# Patient Record
Sex: FEMALE | Race: WHITE | NOT HISPANIC OR LATINO | URBAN - METROPOLITAN AREA
[De-identification: names, ages, dates, MRNs, and addresses within clinical notes are randomized per-mention and may not be internally consistent; named-entity substitution may affect disease eponyms.]

---

## 2020-12-14 ENCOUNTER — OFFICE VISIT (OUTPATIENT)
Dept: URBAN - METROPOLITAN AREA CLINIC 9 | Facility: CLINIC | Age: 25
End: 2020-12-14

## 2022-07-30 ENCOUNTER — TELEPHONE ENCOUNTER (OUTPATIENT)
Age: 27
End: 2022-07-30

## 2022-07-30 RX ORDER — MESALAMINE 4 G/60ML
50 ENEMA RECTAL
Qty: 0 | Refills: 2 | OUTPATIENT
Start: 2019-02-28 | End: 2019-03-06

## 2022-07-30 RX ORDER — ONDANSETRON HYDROCHLORIDE 4 MG/1
1 (ONE) TABLET, FILM COATED ORAL
Qty: 0 | Refills: 2 | OUTPATIENT
Start: 2019-03-14 | End: 2019-03-24

## 2022-07-30 RX ORDER — PREDNISONE 10 MG/1
1 (ONE) TABLET ORAL AS DIRECTED
Qty: 0 | Refills: 2 | OUTPATIENT
Start: 2019-05-06 | End: 2019-07-05

## 2022-07-30 RX ORDER — VEDOLIZUMAB 300 MG/5ML
300 INJECTION, POWDER, LYOPHILIZED, FOR SOLUTION INTRAVENOUS AS DIRECTED
Qty: 0 | Refills: 2 | OUTPATIENT
Start: 2020-03-30 | End: 2020-04-29

## 2022-07-30 RX ORDER — FAMOTIDINE 10 MG/1
2 (TWO) TABLET, FILM COATED ORAL
Qty: 0 | Refills: 16 | OUTPATIENT
Start: 2019-11-20 | End: 2019-12-24

## 2022-07-30 RX ORDER — MESALAMINE 375 MG/1
CAPSULE, EXTENDED RELEASE ORAL
Qty: 0 | Refills: 2 | OUTPATIENT
Start: 2019-12-24 | End: 2020-03-30

## 2022-07-30 RX ORDER — MESALAMINE 1.2 G/1
4 TABLET, DELAYED RELEASE ORAL DAILY
Qty: 0 | Refills: 2 | OUTPATIENT
Start: 2019-07-31 | End: 2019-08-19

## 2022-07-30 RX ORDER — MESALAMINE 1.2 G/1
1 (ONE) TABLET, DELAYED RELEASE ORAL
Qty: 0 | Refills: 2 | OUTPATIENT
Start: 2019-05-21 | End: 2019-07-20

## 2022-07-30 RX ORDER — HYOSCYAMINE SULFATE 0.12 MG/1
1 (ONE) TABLET, ORALLY DISINTEGRATING ORAL
Qty: 0 | Refills: 2 | OUTPATIENT
Start: 2019-05-06 | End: 2019-07-05

## 2022-07-30 RX ORDER — LACTOBACIL 2/BIFIDO 1/S.THERMO 450B CELL
1-2 PACKET (EA) ORAL
Qty: 0 | Refills: 2 | OUTPATIENT
Start: 2019-07-31 | End: 2019-08-30

## 2022-07-30 RX ORDER — HYDROCORTISONE 100 MG/60ML
1 (ONE) ENEMA RECTAL AT BEDTIME
Qty: 0 | Refills: 3 | OUTPATIENT
Start: 2019-05-21 | End: 2019-10-08

## 2022-07-30 RX ORDER — MESALAMINE 4 G/60ML
1 (ONE) KIT SUSPENSION RECTAL AT BEDTIME
Qty: 0 | Refills: 3 | OUTPATIENT
Start: 2019-03-01 | End: 2019-03-14

## 2022-07-30 RX ORDER — MESALAMINE 1.2 G/1
4 TABLET, DELAYED RELEASE ORAL DAILY
Qty: 0 | Refills: 5 | OUTPATIENT
Start: 2019-10-08 | End: 2019-10-08

## 2022-07-30 RX ORDER — DICYCLOMINE HYDROCHLORIDE 10 MG/1
1 (ONE) CAPSULE CAPSULE ORAL
Qty: 0 | Refills: 2 | OUTPATIENT
Start: 2019-02-19 | End: 2019-03-14

## 2022-07-30 RX ORDER — MESALAMINE 375 MG/1
4 CAPSULE, EXTENDED RELEASE ORAL DAILY
Qty: 0 | Refills: 16 | OUTPATIENT
Start: 2019-11-20 | End: 2019-11-20

## 2022-07-30 RX ORDER — BUDESONIDE 3 MG/1
1 (ONE) CAPSULE, COATED PELLETS ORAL
Qty: 0 | Refills: 3 | OUTPATIENT
Start: 2019-12-24 | End: 2020-03-30

## 2022-07-30 RX ORDER — VEDOLIZUMAB 300 MG/5ML
300 INJECTION, POWDER, LYOPHILIZED, FOR SOLUTION INTRAVENOUS AS DIRECTED
Qty: 0 | Refills: 2 | OUTPATIENT
Start: 2019-12-27 | End: 2020-01-26

## 2022-07-30 RX ORDER — MESALAMINE 1000 MG/1
1 (ONE) SUPPOSITORY RECTAL AT BEDTIME
Qty: 0 | Refills: 3 | OUTPATIENT
Start: 2019-10-08 | End: 2019-11-20

## 2022-07-31 ENCOUNTER — TELEPHONE ENCOUNTER (OUTPATIENT)
Age: 27
End: 2022-07-31

## 2022-07-31 RX ORDER — ONDANSETRON HYDROCHLORIDE 4 MG/1
1 (ONE) TABLET, FILM COATED ORAL
Qty: 0 | Refills: 2 | Status: ACTIVE | COMMUNITY
Start: 2019-03-14

## 2022-07-31 RX ORDER — VEDOLIZUMAB 300 MG/5ML
300 INJECTION, POWDER, LYOPHILIZED, FOR SOLUTION INTRAVENOUS AS DIRECTED
Qty: 0 | Refills: 2 | Status: ACTIVE | COMMUNITY
Start: 2019-12-27

## 2022-07-31 RX ORDER — MESALAMINE 375 MG/1
CAPSULE, EXTENDED RELEASE ORAL
Qty: 0 | Refills: 2 | Status: ACTIVE | COMMUNITY
Start: 2019-12-24

## 2022-07-31 RX ORDER — MESALAMINE 1000 MG/1
1 (ONE) SUPPOSITORY RECTAL AT BEDTIME
Qty: 0 | Refills: 3 | Status: ACTIVE | COMMUNITY
Start: 2019-07-31

## 2022-07-31 RX ORDER — HYDROCORTISONE 100 MG/60ML
1 (ONE) ENEMA RECTAL AT BEDTIME
Qty: 0 | Refills: 3 | Status: ACTIVE | COMMUNITY
Start: 2019-03-14

## 2022-07-31 RX ORDER — MESALAMINE 4 G/60ML
1 (ONE) SUSPENSION RECTAL AT BEDTIME
Qty: 0 | Refills: 3 | Status: ACTIVE | COMMUNITY
Start: 2019-03-01

## 2022-07-31 RX ORDER — MESALAMINE 1.2 G/1
1 (ONE) TABLET TABLET, DELAYED RELEASE ORAL
Qty: 0 | Refills: 2 | Status: ACTIVE | COMMUNITY
Start: 2019-03-14

## 2022-07-31 RX ORDER — FAMOTIDINE 10 MG/1
2 (TWO) TABLET, FILM COATED ORAL
Qty: 0 | Refills: 16 | Status: ACTIVE | COMMUNITY
Start: 2019-11-20

## 2022-07-31 RX ORDER — MESALAMINE 1.2 G/1
1 (ONE) TABLET, DELAYED RELEASE ORAL
Qty: 0 | Refills: 2 | Status: ACTIVE | COMMUNITY
Start: 2019-05-06

## 2022-07-31 RX ORDER — HYOSCYAMINE SULFATE 0.12 MG/1
1 (ONE) TABLET, ORALLY DISINTEGRATING ORAL
Qty: 0 | Refills: 2 | Status: ACTIVE | COMMUNITY
Start: 2019-05-06

## 2022-07-31 RX ORDER — HYDROCORTISONE 100 MG/60ML
1 (ONE) ENEMA RECTAL AT BEDTIME
Qty: 0 | Refills: 3 | Status: ACTIVE | COMMUNITY
Start: 2019-05-06

## 2022-07-31 RX ORDER — DICYCLOMINE HYDROCHLORIDE 20 MG/2ML
10 INJECTION, SOLUTION INTRAMUSCULAR
Qty: 0 | Refills: 2 | Status: ACTIVE | COMMUNITY
Start: 2019-02-19

## 2022-07-31 RX ORDER — MESALAMINE 1.2 G/1
4 TABLET, DELAYED RELEASE ORAL DAILY
Qty: 0 | Refills: 5 | Status: ACTIVE | COMMUNITY
Start: 2019-08-21

## 2022-07-31 RX ORDER — HYDROCORTISONE 100 MG/60ML
1 (ONE) ENEMA RECTAL AT BEDTIME
Qty: 0 | Refills: 3 | Status: ACTIVE | COMMUNITY
Start: 2019-05-21

## 2022-07-31 RX ORDER — HYOSCYAMINE SULFATE 0.12 MG/1
1 (ONE) TABLET, ORALLY DISINTEGRATING ORAL
Qty: 0 | Refills: 2 | Status: ACTIVE | COMMUNITY
Start: 2019-03-14

## 2022-07-31 RX ORDER — LACTOBACIL 2/BIFIDO 1/S.THERMO 450B CELL
1-2 PACKET (EA) ORAL
Qty: 0 | Refills: 2 | Status: ACTIVE | COMMUNITY
Start: 2019-07-31

## 2022-07-31 RX ORDER — MESALAMINE 1000 MG/1
1 (ONE) SUPPOSITORY RECTAL AT BEDTIME
Qty: 0 | Refills: 3 | Status: ACTIVE | COMMUNITY
Start: 2019-10-08

## 2022-07-31 RX ORDER — MESALAMINE 4 G/60ML
50 ENEMA RECTAL
Qty: 0 | Refills: 2 | Status: ACTIVE | COMMUNITY
Start: 2019-02-28

## 2022-07-31 RX ORDER — MESALAMINE 1.2 G/1
4 TABLET TABLET, DELAYED RELEASE ORAL DAILY
Qty: 0 | Refills: 5 | Status: ACTIVE | COMMUNITY
Start: 2019-09-30

## 2022-07-31 RX ORDER — VEDOLIZUMAB 300 MG/5ML
300 INJECTION, POWDER, LYOPHILIZED, FOR SOLUTION INTRAVENOUS AS DIRECTED
Qty: 0 | Refills: 2 | Status: ACTIVE | COMMUNITY
Start: 2020-03-30

## 2022-07-31 RX ORDER — MESALAMINE 375 MG/1
4 CAPSULE, EXTENDED RELEASE ORAL DAILY
Qty: 0 | Refills: 16 | Status: ACTIVE | COMMUNITY
Start: 2019-10-08

## 2022-07-31 RX ORDER — MESALAMINE 1.2 G/1
4 TABLET, DELAYED RELEASE ORAL DAILY
Qty: 0 | Refills: 2 | Status: ACTIVE | COMMUNITY
Start: 2019-08-16

## 2022-07-31 RX ORDER — DICYCLOMINE HYDROCHLORIDE 10 MG/1
1 (ONE) CAPSULE ORAL
Qty: 0 | Refills: 2 | Status: ACTIVE | COMMUNITY
Start: 2019-02-19

## 2022-07-31 RX ORDER — MESALAMINE 1.2 G/1
1 (ONE) TABLET, DELAYED RELEASE ORAL
Qty: 0 | Refills: 2 | Status: ACTIVE | COMMUNITY
Start: 2019-05-21

## 2022-07-31 RX ORDER — PREDNISONE 10 MG/1
1 (ONE) TABLET ORAL AS DIRECTED
Qty: 0 | Refills: 2 | Status: ACTIVE | COMMUNITY
Start: 2019-05-06

## 2022-07-31 RX ORDER — VEDOLIZUMAB 300 MG/5ML
300 INJECTION, POWDER, LYOPHILIZED, FOR SOLUTION INTRAVENOUS AS DIRECTED
Qty: 0 | Refills: 2 | Status: ACTIVE | COMMUNITY
Start: 2019-12-24

## 2022-07-31 RX ORDER — MESALAMINE 1.2 G/1
4 TABLET, DELAYED RELEASE ORAL DAILY
Qty: 0 | Refills: 2 | Status: ACTIVE | COMMUNITY
Start: 2019-07-31

## 2022-07-31 RX ORDER — BUDESONIDE 3 MG/1
1 (ONE) CAPSULE, COATED PELLETS ORAL
Qty: 0 | Refills: 3 | Status: ACTIVE | COMMUNITY
Start: 2019-11-20

## 2022-07-31 RX ORDER — BUDESONIDE 3 MG/1
1 (ONE) CAPSULE, COATED PELLETS ORAL
Qty: 0 | Refills: 3 | Status: ACTIVE | COMMUNITY
Start: 2019-12-24

## 2022-07-31 RX ORDER — MESALAMINE 4 G/60ML
1 (ONE) SUSPENSION RECTAL AT BEDTIME
Qty: 0 | Refills: 3 | Status: ACTIVE | COMMUNITY
Start: 2019-02-28

## 2022-11-22 ENCOUNTER — TELEPHONE ENCOUNTER (OUTPATIENT)
Dept: URBAN - METROPOLITAN AREA CLINIC 9 | Facility: CLINIC | Age: 27
End: 2022-11-22

## 2022-11-29 ENCOUNTER — TELEPHONE ENCOUNTER (OUTPATIENT)
Dept: URBAN - METROPOLITAN AREA CLINIC 7 | Facility: CLINIC | Age: 27
End: 2022-11-29

## 2022-12-21 ENCOUNTER — OFFICE VISIT (OUTPATIENT)
Dept: URBAN - METROPOLITAN AREA CLINIC 9 | Facility: CLINIC | Age: 27
End: 2022-12-21
Payer: COMMERCIAL

## 2022-12-21 ENCOUNTER — WEB ENCOUNTER (OUTPATIENT)
Dept: URBAN - METROPOLITAN AREA CLINIC 9 | Facility: CLINIC | Age: 27
End: 2022-12-21

## 2022-12-21 VITALS
DIASTOLIC BLOOD PRESSURE: 80 MMHG | WEIGHT: 272 LBS | HEIGHT: 63 IN | BODY MASS INDEX: 48.2 KG/M2 | SYSTOLIC BLOOD PRESSURE: 138 MMHG

## 2022-12-21 DIAGNOSIS — K51.911 ULCERATIVE COLITIS WITH RECTAL BLEEDING: ICD-10-CM

## 2022-12-21 DIAGNOSIS — K21.9 GASTRO-ESOPHAGEAL REFLUX DISEASE WITHOUT ESOPHAGITIS: ICD-10-CM

## 2022-12-21 PROCEDURE — 99214 OFFICE O/P EST MOD 30 MIN: CPT | Performed by: INTERNAL MEDICINE

## 2022-12-21 RX ORDER — BUDESONIDE 3 MG/1
1 (ONE) CAPSULE, COATED PELLETS ORAL
Qty: 0 | Refills: 3 | Status: DISCONTINUED | COMMUNITY
Start: 2019-12-24

## 2022-12-21 RX ORDER — MESALAMINE 1000 MG/1
1 (ONE) SUPPOSITORY RECTAL AT BEDTIME
Qty: 0 | Refills: 3 | Status: DISCONTINUED | COMMUNITY
Start: 2019-10-08

## 2022-12-21 RX ORDER — PREDNISONE 10 MG/1
1 (ONE) TABLET ORAL AS DIRECTED
Qty: 0 | Refills: 2 | Status: DISCONTINUED | COMMUNITY
Start: 2019-05-06

## 2022-12-21 RX ORDER — MESALAMINE 4 G/60ML
1 (ONE) SUSPENSION RECTAL AT BEDTIME
Qty: 0 | Refills: 3 | Status: DISCONTINUED | COMMUNITY
Start: 2019-02-28

## 2022-12-21 RX ORDER — LACTOBACIL 2/BIFIDO 1/S.THERMO 450B CELL
1-2 PACKET (EA) ORAL
Qty: 0 | Refills: 2 | Status: DISCONTINUED | COMMUNITY
Start: 2019-07-31

## 2022-12-21 RX ORDER — MESALAMINE 4 G/60ML
50 ENEMA RECTAL
Qty: 0 | Refills: 2 | Status: DISCONTINUED | COMMUNITY
Start: 2019-02-28

## 2022-12-21 RX ORDER — MESALAMINE 1.2 G/1
4 TABLET, DELAYED RELEASE ORAL DAILY
Qty: 0 | Refills: 2 | Status: DISCONTINUED | COMMUNITY
Start: 2019-07-31

## 2022-12-21 RX ORDER — HYOSCYAMINE SULFATE 0.12 MG/1
1 (ONE) TABLET, ORALLY DISINTEGRATING ORAL
Qty: 0 | Refills: 2 | Status: DISCONTINUED | COMMUNITY
Start: 2019-03-14

## 2022-12-21 RX ORDER — DICYCLOMINE HYDROCHLORIDE 20 MG/2ML
10 INJECTION, SOLUTION INTRAMUSCULAR
Qty: 0 | Refills: 2 | Status: DISCONTINUED | COMMUNITY
Start: 2019-02-19

## 2022-12-21 RX ORDER — VEDOLIZUMAB 300 MG/5ML
300 INJECTION, POWDER, LYOPHILIZED, FOR SOLUTION INTRAVENOUS AS DIRECTED
Qty: 0 | Refills: 2 | Status: ACTIVE | COMMUNITY
Start: 2019-12-24

## 2022-12-21 RX ORDER — DICYCLOMINE HYDROCHLORIDE 10 MG/1
1 (ONE) CAPSULE ORAL
Qty: 0 | Refills: 2 | Status: DISCONTINUED | COMMUNITY
Start: 2019-02-19

## 2022-12-21 RX ORDER — HYDROCORTISONE 100 MG/60ML
1 (ONE) ENEMA RECTAL AT BEDTIME
Qty: 0 | Refills: 3 | Status: DISCONTINUED | COMMUNITY
Start: 2019-05-06

## 2022-12-21 RX ORDER — MESALAMINE 375 MG/1
CAPSULE, EXTENDED RELEASE ORAL
Qty: 0 | Refills: 2 | Status: DISCONTINUED | COMMUNITY
Start: 2019-12-24

## 2022-12-21 RX ORDER — HYDROXYZINE HYDROCHLORIDE 25 MG/1
AS DIRECTED TABLET, FILM COATED ORAL TID
Status: ACTIVE | COMMUNITY

## 2022-12-21 RX ORDER — FAMOTIDINE 10 MG/1
2 (TWO) TABLET, FILM COATED ORAL
Qty: 0 | Refills: 16 | Status: DISCONTINUED | COMMUNITY
Start: 2019-11-20

## 2022-12-21 RX ORDER — VEDOLIZUMAB 300 MG/5ML
300 INJECTION, POWDER, LYOPHILIZED, FOR SOLUTION INTRAVENOUS AS DIRECTED
OUTPATIENT
Start: 2019-12-24

## 2022-12-21 RX ORDER — ONDANSETRON HYDROCHLORIDE 4 MG/1
1 (ONE) TABLET, FILM COATED ORAL
Qty: 0 | Refills: 2 | Status: DISCONTINUED | COMMUNITY
Start: 2019-03-14

## 2022-12-21 RX ORDER — MESALAMINE 1.2 G/1
4 TABLET, DELAYED RELEASE ORAL DAILY
Qty: 0 | Refills: 2 | Status: DISCONTINUED | COMMUNITY
Start: 2019-08-16

## 2022-12-21 NOTE — HPI-TODAY'S VISIT:
Pt with distal UC here for follow up. Dx in 2019 . Pt has ongoing UC symptoms . Colonoscopy 2/2019 with ulcerative proctitis, more proximal biopsied negative 2019 EGD with negative HP and duodenal biopsies.  Failed: Lialda 4 tabs daily Failed apriso Failed canasa Failed hydrocortisone Failed steroids Diet changes Plan based diet Turmeric VSL#3 . Pt here today to re-0establish after nearliy 3 years. She has continued entyvio but has not seen a GI. I advised she needs close f/u and cannot have lapses. I advised we cont entyvio but check the levels, TB, HIV, hepatitis B and fecal calpro and colon to re-assess disease activity. She is agreeable.  .

## 2022-12-30 ENCOUNTER — CLAIMS CREATED FROM THE CLAIM WINDOW (OUTPATIENT)
Dept: URBAN - METROPOLITAN AREA CLINIC 4 | Facility: CLINIC | Age: 27
End: 2022-12-30
Payer: COMMERCIAL

## 2022-12-30 ENCOUNTER — CLAIMS CREATED FROM THE CLAIM WINDOW (OUTPATIENT)
Dept: URBAN - METROPOLITAN AREA SURGERY CENTER 9 | Facility: SURGERY CENTER | Age: 27
End: 2022-12-30
Payer: COMMERCIAL

## 2022-12-30 DIAGNOSIS — K63.89 BACTERIAL OVERGROWTH SYNDROME: ICD-10-CM

## 2022-12-30 DIAGNOSIS — K64.0 BLEEDING GRADE I HEMORRHOIDS: ICD-10-CM

## 2022-12-30 DIAGNOSIS — K63.89 OTHER SPECIFIED DISEASES OF INTESTINE: ICD-10-CM

## 2022-12-30 DIAGNOSIS — Z87.19 ESOPHAGEAL FOOD BOLUS: ICD-10-CM

## 2022-12-30 PROCEDURE — 45380 COLONOSCOPY AND BIOPSY: CPT | Performed by: INTERNAL MEDICINE

## 2022-12-30 PROCEDURE — 88305 TISSUE EXAM BY PATHOLOGIST: CPT | Performed by: PATHOLOGY

## 2022-12-30 RX ORDER — HYDROXYZINE HYDROCHLORIDE 25 MG/1
AS DIRECTED TABLET, FILM COATED ORAL TID
Status: ACTIVE | COMMUNITY

## 2022-12-30 RX ORDER — VEDOLIZUMAB 300 MG/5ML
300 INJECTION, POWDER, LYOPHILIZED, FOR SOLUTION INTRAVENOUS AS DIRECTED
Status: ACTIVE | COMMUNITY
Start: 2019-12-24

## 2023-01-30 LAB — CALPROTECTIN, FECAL: 59

## 2023-02-03 ENCOUNTER — OFFICE VISIT (OUTPATIENT)
Dept: URBAN - METROPOLITAN AREA CLINIC 9 | Facility: CLINIC | Age: 28
End: 2023-02-03
Payer: COMMERCIAL

## 2023-02-03 VITALS
BODY MASS INDEX: 47.84 KG/M2 | SYSTOLIC BLOOD PRESSURE: 118 MMHG | HEIGHT: 63 IN | WEIGHT: 270 LBS | DIASTOLIC BLOOD PRESSURE: 78 MMHG

## 2023-02-03 DIAGNOSIS — K51.911 ULCERATIVE COLITIS WITH RECTAL BLEEDING: ICD-10-CM

## 2023-02-03 DIAGNOSIS — K21.9 GASTRO-ESOPHAGEAL REFLUX DISEASE WITHOUT ESOPHAGITIS: ICD-10-CM

## 2023-02-03 LAB
HEPATITIS B CORE AB TOTAL: (no result)
HEPATITIS B SURFACE AB IMMUNITY, QN: >1000
HEPATITIS B SURFACE ANTIGEN: (no result)
HIV AG/AB, 4TH GEN: (no result)
MITOGEN-NIL: 9.84
QUANTIFERON NIL VALUE: 0.01
QUANTIFERON TB1 AG VALUE: 0.01
QUANTIFERON TB2 AG VALUE: 0.01
QUANTIFERON-TB GOLD PLUS: NEGATIVE
VEDOLIZUMAB AB, S: <9.8
VEDOLIZUMAB QN, S: 8.8
VEMAB INTERPRETATION: (no result)

## 2023-02-03 PROCEDURE — 99214 OFFICE O/P EST MOD 30 MIN: CPT | Performed by: INTERNAL MEDICINE

## 2023-02-03 RX ORDER — HYDROXYZINE HYDROCHLORIDE 25 MG/1
AS DIRECTED TABLET, FILM COATED ORAL TID
Status: ACTIVE | COMMUNITY

## 2023-02-03 RX ORDER — VEDOLIZUMAB 300 MG/5ML
300 INJECTION, POWDER, LYOPHILIZED, FOR SOLUTION INTRAVENOUS AS DIRECTED
OUTPATIENT
Start: 2019-12-24

## 2023-02-03 RX ORDER — CHOLECALCIFEROL (VITAMIN D3) 50 MCG
1 TABLET TABLET ORAL ONCE A DAY
Status: ACTIVE | COMMUNITY

## 2023-02-03 RX ORDER — MULTIVITAMIN WITH IRON
AS DIRECTED TABLET ORAL
Status: ACTIVE | COMMUNITY

## 2023-02-03 RX ORDER — LACTOBACIL 2/BIFIDO 1/S.THERMO 900B CELL
2 CAPSULES PACKET (EA) ORAL DAILY
Qty: 60 | Refills: 6 | OUTPATIENT
Start: 2023-02-03 | End: 2023-09-01

## 2023-02-03 RX ORDER — DICYCLOMINE HYDROCHLORIDE 10 MG/1
1 CAPSULE CAPSULE ORAL
Qty: 60 | Refills: 3 | OUTPATIENT

## 2023-02-03 RX ORDER — VEDOLIZUMAB 300 MG/5ML
300 INJECTION, POWDER, LYOPHILIZED, FOR SOLUTION INTRAVENOUS AS DIRECTED
Status: ACTIVE | COMMUNITY
Start: 2019-12-24

## 2023-02-03 NOTE — HPI-TODAY'S VISIT:
Pt with distal UC here for follow up. Dx in 2019 . Pt has ongoing UC symptoms . Colonoscopy 2/2019 with ulcerative proctitis, more proximal biopsied negative 2019 EGD with negative HP and duodenal biopsies. 2023 Colon with bx neg for active colitis . Failed: Lialda 4 tabs daily Failed apriso Failed canasa Failed hydrocortisone Failed steroids Diet changes Plan based diet Turmeric VSL#3 . 2022 FEcal calpro normal HIV/Hep B/TB neg Entyvio level pending . I reviewed her stool testes, her hepatitis labs and TB test . Pt here for f/u, overall is doing well but some IBS symptoms. We are awaiting her entyvio level but otherwise her colon and calpro would suggest remission. RTC 6 months and hold on any changes provided entyvio is stable.  .  .

## 2023-02-21 ENCOUNTER — TELEPHONE ENCOUNTER (OUTPATIENT)
Dept: URBAN - METROPOLITAN AREA CLINIC 7 | Facility: CLINIC | Age: 28
End: 2023-02-21

## 2023-08-04 ENCOUNTER — OFFICE VISIT (OUTPATIENT)
Dept: URBAN - METROPOLITAN AREA CLINIC 9 | Facility: CLINIC | Age: 28
End: 2023-08-04

## 2023-09-07 ENCOUNTER — WEB ENCOUNTER (OUTPATIENT)
Dept: URBAN - METROPOLITAN AREA CLINIC 9 | Facility: CLINIC | Age: 28
End: 2023-09-07

## 2023-09-07 ENCOUNTER — OFFICE VISIT (OUTPATIENT)
Dept: URBAN - METROPOLITAN AREA CLINIC 9 | Facility: CLINIC | Age: 28
End: 2023-09-07
Payer: COMMERCIAL

## 2023-09-07 VITALS
WEIGHT: 262 LBS | SYSTOLIC BLOOD PRESSURE: 132 MMHG | DIASTOLIC BLOOD PRESSURE: 72 MMHG | BODY MASS INDEX: 46.42 KG/M2 | HEIGHT: 63 IN

## 2023-09-07 DIAGNOSIS — K51.911 ULCERATIVE COLITIS WITH RECTAL BLEEDING: ICD-10-CM

## 2023-09-07 PROCEDURE — 99213 OFFICE O/P EST LOW 20 MIN: CPT | Performed by: INTERNAL MEDICINE

## 2023-09-07 RX ORDER — VEDOLIZUMAB 300 MG/5ML
300 INJECTION, POWDER, LYOPHILIZED, FOR SOLUTION INTRAVENOUS AS DIRECTED
OUTPATIENT
Start: 2019-12-24

## 2023-09-07 RX ORDER — CHOLECALCIFEROL (VITAMIN D3) 50 MCG
1 TABLET TABLET ORAL ONCE A DAY
Status: ACTIVE | COMMUNITY

## 2023-09-07 RX ORDER — DESVENLAFAXINE 50 MG/1
1 TABLET TABLET, EXTENDED RELEASE ORAL ONCE A DAY
Status: ACTIVE | COMMUNITY

## 2023-09-07 RX ORDER — DICYCLOMINE HYDROCHLORIDE 10 MG/1
1 CAPSULE CAPSULE ORAL
OUTPATIENT

## 2023-09-07 RX ORDER — MULTIVITAMIN WITH IRON
AS DIRECTED TABLET ORAL
Status: ACTIVE | COMMUNITY

## 2023-09-07 RX ORDER — VEDOLIZUMAB 300 MG/5ML
300 INJECTION, POWDER, LYOPHILIZED, FOR SOLUTION INTRAVENOUS AS DIRECTED
Status: ACTIVE | COMMUNITY
Start: 2019-12-24

## 2023-09-07 RX ORDER — HYDROXYZINE HYDROCHLORIDE 25 MG/1
AS DIRECTED TABLET, FILM COATED ORAL TID
Status: ACTIVE | COMMUNITY

## 2023-09-07 RX ORDER — DICYCLOMINE HYDROCHLORIDE 10 MG/1
1 CAPSULE CAPSULE ORAL
Qty: 60 | Refills: 3 | Status: ON HOLD | COMMUNITY

## 2023-09-07 NOTE — HPI-TODAY'S VISIT:
Pt with distal UC here for follow up. Dx in 2019 . Pt has ongoing UC symptoms . Colonoscopy 2/2019 with ulcerative proctitis, more proximal biopsied negative 2019 EGD with negative HP and duodenal biopsies. 2023 Colon with bx neg for active colitis . Failed: Lialda 4 tabs daily Failed apriso Failed canasa Failed hydrocortisone Failed steroids Diet changes Plan based diet Turmeric VSL#3 . 2022 FEcal calpro normal HIV/Hep B/TB neg Entyvio level 8 in early 2023, no ab . Today she is here for f/u. She has been off entyvio since 5/2023 due to insurance issues and we will restart her entyvio. She is agreeable. RTC 6 mo.  .

## 2023-09-08 ENCOUNTER — OFFICE VISIT (OUTPATIENT)
Dept: URBAN - METROPOLITAN AREA CLINIC 9 | Facility: CLINIC | Age: 28
End: 2023-09-08

## 2023-09-12 ENCOUNTER — TELEPHONE ENCOUNTER (OUTPATIENT)
Dept: URBAN - METROPOLITAN AREA CLINIC 7 | Facility: CLINIC | Age: 28
End: 2023-09-12

## 2023-09-12 RX ORDER — VEDOLIZUMAB 300 MG/5ML
300 INJECTION, POWDER, LYOPHILIZED, FOR SOLUTION INTRAVENOUS AS DIRECTED
Start: 2019-12-24

## 2023-09-22 ENCOUNTER — TELEPHONE ENCOUNTER (OUTPATIENT)
Dept: URBAN - METROPOLITAN AREA CLINIC 9 | Facility: CLINIC | Age: 28
End: 2023-09-22

## 2023-12-20 ENCOUNTER — TELEPHONE ENCOUNTER (OUTPATIENT)
Dept: URBAN - METROPOLITAN AREA CLINIC 9 | Facility: CLINIC | Age: 28
End: 2023-12-20

## 2023-12-20 RX ORDER — VEDOLIZUMAB 300 MG/5ML
300 INJECTION, POWDER, LYOPHILIZED, FOR SOLUTION INTRAVENOUS
Qty: 1 | Refills: 6
Start: 2019-12-24 | End: 2025-01-15

## 2024-03-07 ENCOUNTER — OV EP (OUTPATIENT)
Dept: URBAN - METROPOLITAN AREA CLINIC 9 | Facility: CLINIC | Age: 29
End: 2024-03-07

## 2024-05-02 ENCOUNTER — OFFICE VISIT (OUTPATIENT)
Dept: URBAN - METROPOLITAN AREA CLINIC 9 | Facility: CLINIC | Age: 29
End: 2024-05-02
Payer: COMMERCIAL

## 2024-05-02 ENCOUNTER — DASHBOARD ENCOUNTERS (OUTPATIENT)
Age: 29
End: 2024-05-02

## 2024-05-02 VITALS
SYSTOLIC BLOOD PRESSURE: 128 MMHG | BODY MASS INDEX: 46.42 KG/M2 | WEIGHT: 262 LBS | HEIGHT: 63 IN | DIASTOLIC BLOOD PRESSURE: 74 MMHG

## 2024-05-02 DIAGNOSIS — K21.9 GASTRO-ESOPHAGEAL REFLUX DISEASE WITHOUT ESOPHAGITIS: ICD-10-CM

## 2024-05-02 DIAGNOSIS — K51.911 ULCERATIVE COLITIS WITH RECTAL BLEEDING: ICD-10-CM

## 2024-05-02 PROCEDURE — 99214 OFFICE O/P EST MOD 30 MIN: CPT | Performed by: INTERNAL MEDICINE

## 2024-05-02 RX ORDER — DICYCLOMINE HYDROCHLORIDE 10 MG/1
1 CAPSULE CAPSULE ORAL
Status: ON HOLD | COMMUNITY

## 2024-05-02 RX ORDER — VEDOLIZUMAB 300 MG/5ML
300 INJECTION, POWDER, LYOPHILIZED, FOR SOLUTION INTRAVENOUS
Qty: 1 | Refills: 6 | Status: ACTIVE | COMMUNITY
Start: 2019-12-24 | End: 2025-01-15

## 2024-05-02 RX ORDER — MULTIVITAMIN WITH IRON
AS DIRECTED TABLET ORAL
Status: ON HOLD | COMMUNITY

## 2024-05-02 RX ORDER — CHOLECALCIFEROL (VITAMIN D3) 50 MCG
1 TABLET TABLET ORAL ONCE A DAY
Status: ON HOLD | COMMUNITY

## 2024-05-02 RX ORDER — DESVENLAFAXINE 50 MG/1
1 TABLET TABLET, EXTENDED RELEASE ORAL ONCE A DAY
Status: ON HOLD | COMMUNITY

## 2024-05-02 RX ORDER — VEDOLIZUMAB 300 MG/5ML
300 INJECTION, POWDER, LYOPHILIZED, FOR SOLUTION INTRAVENOUS
OUTPATIENT
Start: 2019-12-24

## 2024-05-02 RX ORDER — HYDROXYZINE HYDROCHLORIDE 25 MG/1
AS DIRECTED TABLET, FILM COATED ORAL TID
Status: ON HOLD | COMMUNITY

## 2024-05-17 ENCOUNTER — TELEPHONE ENCOUNTER (OUTPATIENT)
Dept: URBAN - METROPOLITAN AREA CLINIC 9 | Facility: CLINIC | Age: 29
End: 2024-05-17

## 2024-05-22 ENCOUNTER — TELEPHONE ENCOUNTER (OUTPATIENT)
Dept: URBAN - METROPOLITAN AREA CLINIC 9 | Facility: CLINIC | Age: 29
End: 2024-05-22

## 2024-06-07 ENCOUNTER — TELEPHONE ENCOUNTER (OUTPATIENT)
Dept: URBAN - METROPOLITAN AREA CLINIC 9 | Facility: CLINIC | Age: 29
End: 2024-06-07

## 2024-06-26 ENCOUNTER — TELEPHONE ENCOUNTER (OUTPATIENT)
Dept: URBAN - METROPOLITAN AREA CLINIC 9 | Facility: CLINIC | Age: 29
End: 2024-06-26

## 2024-07-05 ENCOUNTER — TELEPHONE ENCOUNTER (OUTPATIENT)
Dept: URBAN - METROPOLITAN AREA CLINIC 9 | Facility: CLINIC | Age: 29
End: 2024-07-05

## 2024-07-30 ENCOUNTER — TELEPHONE ENCOUNTER (OUTPATIENT)
Dept: URBAN - METROPOLITAN AREA CLINIC 9 | Facility: CLINIC | Age: 29
End: 2024-07-30

## 2024-10-02 ENCOUNTER — OFFICE VISIT (OUTPATIENT)
Dept: URBAN - METROPOLITAN AREA CLINIC 9 | Facility: CLINIC | Age: 29
End: 2024-10-02
Payer: COMMERCIAL

## 2024-10-02 VITALS
WEIGHT: 280 LBS | DIASTOLIC BLOOD PRESSURE: 76 MMHG | HEIGHT: 63 IN | SYSTOLIC BLOOD PRESSURE: 126 MMHG | BODY MASS INDEX: 49.61 KG/M2

## 2024-10-02 DIAGNOSIS — K51.911 ULCERATIVE COLITIS WITH RECTAL BLEEDING: ICD-10-CM

## 2024-10-02 PROCEDURE — 99213 OFFICE O/P EST LOW 20 MIN: CPT | Performed by: INTERNAL MEDICINE

## 2024-10-02 RX ORDER — CHOLECALCIFEROL (VITAMIN D3) 50 MCG
1 TABLET TABLET ORAL ONCE A DAY
Status: ON HOLD | COMMUNITY

## 2024-10-02 RX ORDER — HYDROXYZINE HYDROCHLORIDE 25 MG/1
AS DIRECTED TABLET, FILM COATED ORAL TID
Status: ON HOLD | COMMUNITY

## 2024-10-02 RX ORDER — VEDOLIZUMAB 300 MG/5ML
300 INJECTION, POWDER, LYOPHILIZED, FOR SOLUTION INTRAVENOUS
OUTPATIENT
Start: 2019-12-24

## 2024-10-02 RX ORDER — MULTIVITAMIN WITH IRON
AS DIRECTED TABLET ORAL
Status: ON HOLD | COMMUNITY

## 2024-10-02 RX ORDER — DICYCLOMINE HYDROCHLORIDE 10 MG/1
1 CAPSULE CAPSULE ORAL
Status: ON HOLD | COMMUNITY

## 2024-10-02 RX ORDER — VEDOLIZUMAB 300 MG/5ML
300 INJECTION, POWDER, LYOPHILIZED, FOR SOLUTION INTRAVENOUS
Status: ON HOLD | COMMUNITY
Start: 2019-12-24

## 2024-10-02 RX ORDER — DESVENLAFAXINE 50 MG/1
1 TABLET TABLET, EXTENDED RELEASE ORAL ONCE A DAY
Status: ON HOLD | COMMUNITY

## 2024-10-02 NOTE — PHYSICAL EXAM HENT:
Head, normocephalic, atraumatic, External nose normal appearance, Clear bilaterally, pupils equal, round and reactive to light.

## 2024-10-02 NOTE — HPI-TODAY'S VISIT:
Pt with distal UC here for follow up. Dx in 2019 . Colonoscopy 2/2019 with ulcerative proctitis, more proximal biopsied negative 2019 EGD with negative HP and duodenal biopsies. 2023 Colon with bx neg for active colitis . Failed: Lialda 4 tabs daily Failed apriso Failed canasa Failed hydrocortisone Failed steroids Diet changes Plan based diet Turmeric VSL#3 . 2022 Fecal calpro normal HIV/Hep B/TB neg . Entyvio level 8 in early 2023, no ab . At last visit she was doing well. We ordered HIV, hep B, TB but she didn't do. I did review CBC, BMP, lfts from 9/2024 was negative. We do not have a recent fecal calprotectin. I advised we check that. Her last colon was clear in 2022. She is doing well without bleeding. She may still be in remission and hasn't had entyvio for 5 months. We will check a calpro. If low hold on entyvio unless sx develop. If high then we need to restart entyvio and hopefully she will not reject.  . .   .

## 2024-10-18 LAB — HIV AG/AB, 4TH GEN: (no result)

## 2024-10-19 LAB
HEPATITIS B SURFACE AB IMMUNITY, QN: >1000
HEPATITIS B SURFACE ANTIGEN: (no result)
MITOGEN-NIL: 7.27
QUANTIFERON NIL VALUE: 0.03
QUANTIFERON TB1 AG VALUE: 0.02
QUANTIFERON TB2 AG VALUE: 0.01
QUANTIFERON-TB GOLD PLUS: NEGATIVE

## 2024-11-06 ENCOUNTER — TELEPHONE ENCOUNTER (OUTPATIENT)
Dept: URBAN - METROPOLITAN AREA CLINIC 7 | Facility: CLINIC | Age: 29
End: 2024-11-06

## 2025-03-31 ENCOUNTER — OFFICE VISIT (OUTPATIENT)
Dept: URBAN - METROPOLITAN AREA CLINIC 9 | Facility: CLINIC | Age: 30
End: 2025-03-31
Payer: COMMERCIAL

## 2025-03-31 DIAGNOSIS — K51.20 ULCERATIVE PROCTITIS WITHOUT COMPLICATION: ICD-10-CM

## 2025-03-31 PROBLEM — 10811000202109: Status: ACTIVE | Noted: 2025-03-31

## 2025-03-31 PROCEDURE — 99213 OFFICE O/P EST LOW 20 MIN: CPT | Performed by: PHYSICIAN ASSISTANT

## 2025-03-31 RX ORDER — DICYCLOMINE HYDROCHLORIDE 10 MG/1
1 CAPSULE CAPSULE ORAL
Status: DISCONTINUED | COMMUNITY

## 2025-03-31 RX ORDER — DESVENLAFAXINE 50 MG/1
1 TABLET TABLET, EXTENDED RELEASE ORAL ONCE A DAY
Status: DISCONTINUED | COMMUNITY

## 2025-03-31 RX ORDER — HYDROXYZINE HYDROCHLORIDE 25 MG/1
AS DIRECTED TABLET, FILM COATED ORAL TID
Status: ON HOLD | COMMUNITY

## 2025-03-31 RX ORDER — MULTIVITAMIN WITH IRON
AS DIRECTED TABLET ORAL
Status: ON HOLD | COMMUNITY

## 2025-03-31 RX ORDER — CHOLECALCIFEROL (VITAMIN D3) 50 MCG
1 TABLET TABLET ORAL ONCE A DAY
Status: ON HOLD | COMMUNITY

## 2025-03-31 NOTE — HPI-TODAY'S VISIT:
Pt with distal UC- dx in 2019.  Former patient of Dr. Oliva.  Failed: Lialda, apriso, canasa Failed hydrocortisone Diet changes Plan based diet Turmeric VSL#3 . ENDOs: Colonoscopy 2/2019 with ulcerative proctitis, more proximal biopsied negative 2019 EGD with negative HP and duodenal biopsies. 2023 Colon with bx neg for active colitis . Diagnostics: 2022 Fecal calpro normal Entyvio level 8 in early 2023, no ab 10/2024 vitamin B12-NL, vitamin D low 11/2024 fecal Shayan Pro normal, Hepatitis B surface showed immunity, QuantiFERON gold negative hep B surface antigen nonreactive . Interim visit 3/31/2025 30-year-old patient presents today in follow-up+ hx of distal ulcerative colitis dx 2019.  Has been in remission.  Off Entyvio for a year.  Had normal fecal Shayan 11/2024.  Last colonoscopy 2023 - neg for active colitis. No diarrhea, blood, or mucus except for some IBS-D like symptoms after a meal once in a while.   Will check a fecal calprotectin. Repeat colon 2026 /2027 for surveillance. Dx 2/2029.  RTC 6 months sooner if symptoms were to develop...

## 2025-04-29 ENCOUNTER — OFFICE VISIT (OUTPATIENT)
Dept: URBAN - METROPOLITAN AREA CLINIC 9 | Facility: CLINIC | Age: 30
End: 2025-04-29

## 2025-05-03 ENCOUNTER — LAB OUTSIDE AN ENCOUNTER (OUTPATIENT)
Dept: URBAN - METROPOLITAN AREA CLINIC 7 | Facility: CLINIC | Age: 30
End: 2025-05-03

## 2025-07-30 ENCOUNTER — OFFICE VISIT (OUTPATIENT)
Age: 30
End: 2025-07-30

## 2025-07-30 VITALS
RESPIRATION RATE: 18 BRPM | BODY MASS INDEX: 51.21 KG/M2 | HEART RATE: 94 BPM | TEMPERATURE: 98.4 F | DIASTOLIC BLOOD PRESSURE: 87 MMHG | HEIGHT: 63 IN | WEIGHT: 289 LBS | SYSTOLIC BLOOD PRESSURE: 120 MMHG | OXYGEN SATURATION: 96 %

## 2025-07-30 DIAGNOSIS — J01.90 ACUTE BACTERIAL SINUSITIS: Primary | ICD-10-CM

## 2025-07-30 DIAGNOSIS — B96.89 ACUTE BACTERIAL SINUSITIS: Primary | ICD-10-CM

## 2025-07-30 RX ORDER — AZITHROMYCIN 250 MG/1
TABLET, FILM COATED ORAL
Qty: 6 TABLET | Refills: 0 | Status: SHIPPED | OUTPATIENT
Start: 2025-07-30 | End: 2025-08-09

## 2025-07-30 RX ORDER — AZITHROMYCIN 250 MG/1
TABLET, FILM COATED ORAL
Qty: 6 TABLET | Refills: 0 | Status: SHIPPED | OUTPATIENT
Start: 2025-07-30 | End: 2025-07-30

## 2025-07-30 ASSESSMENT — ENCOUNTER SYMPTOMS
WHEEZING: 0
CHEST TIGHTNESS: 0
SINUS PAIN: 1
GASTROINTESTINAL NEGATIVE: 1
COUGH: 1
SINUS PRESSURE: 1
SORE THROAT: 1
SHORTNESS OF BREATH: 0

## 2025-07-30 NOTE — PROGRESS NOTES
SUBJECTIVE/OBJECTIVE:    Limitation to History: None    Outside Historian: None    External Records Reviewed: None    SUBJECTIVE/OBJECTIVE:  Leatha Romero is a 30 y.o. female presents with complaint of nasal congestion sore throat cough and new onset lost voice for over 1 week.  Patient states her symptoms are worsening.  She reports her mucus returning from whitish to now thick and green.  She reports having quite a bit of postnasal drip, and has lost her voice as a result.  She does have tenderness of her maxillary sinuses, and has a history of sinus infections in the past.  She denies history of asthma or pneumonia. she is a non-smoker, and has been using DayQuil for symptom relief, however she reports that her symptoms are not improving.    History provided by:  Patient   used: No    Pharyngitis  Associated symptoms: congestion, cough and sore throat    Associated symptoms: no chest pain, no ear pain, no fatigue, no fever, no rash, no shortness of breath and no wheezing           Congestion (Pt states onset of sx last Wednesday. Pt states that her sx are worsening. Pt states that when her sx started she was having a lot of sinus congestion, but sx have since developed into chest congestion. Pt lost her voice Monday. Pt denies fever.) and Pharyngitis (Pt states that the sore throat feels like it is due to post nasal drip. )      No results found for any visits on 07/30/25.       The results of the diagnostic studies have been independently interpreted by myself: All studies will be over-read by Radiologist and patient will be called if any changes to treatment or diagnosis based on final read.     Review of Systems   Constitutional:  Negative for chills, fatigue and fever.   HENT:  Positive for congestion, postnasal drip, sinus pressure, sinus pain and sore throat. Negative for ear pain.    Respiratory:  Positive for cough. Negative for chest tightness, shortness of breath and wheezing.